# Patient Record
Sex: MALE | Race: WHITE | NOT HISPANIC OR LATINO | Employment: UNEMPLOYED | URBAN - METROPOLITAN AREA
[De-identification: names, ages, dates, MRNs, and addresses within clinical notes are randomized per-mention and may not be internally consistent; named-entity substitution may affect disease eponyms.]

---
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
## 2022-10-12 ENCOUNTER — HOSPITAL ENCOUNTER (EMERGENCY)
Facility: HOSPITAL | Age: 22
Discharge: HOME | End: 2022-10-12
Attending: EMERGENCY MEDICINE
Payer: COMMERCIAL

## 2022-10-12 VITALS
BODY MASS INDEX: 20.36 KG/M2 | WEIGHT: 145.94 LBS | OXYGEN SATURATION: 99 % | HEART RATE: 94 BPM | RESPIRATION RATE: 18 BRPM | SYSTOLIC BLOOD PRESSURE: 152 MMHG | TEMPERATURE: 98 F | DIASTOLIC BLOOD PRESSURE: 100 MMHG
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
## 2022-10-12 DIAGNOSIS — S90.462A INSECT BITE OF LEFT GREAT TOE, INITIAL ENCOUNTER: Primary | ICD-10-CM

## 2022-10-12 PROCEDURE — 99284 EMERGENCY DEPT VISIT MOD MDM: CPT

## 2022-10-12 PROCEDURE — 25000003 PHARM REV CODE 250: Performed by: EMERGENCY MEDICINE

## 2022-10-12 RX ORDER — ONDANSETRON 4 MG/1
4 TABLET, ORALLY DISINTEGRATING ORAL
Status: COMPLETED | OUTPATIENT
Start: 2022-10-12 | End: 2022-10-12

## 2022-10-12 RX ORDER — DICLOFENAC SODIUM 75 MG/1
75 TABLET, DELAYED RELEASE ORAL 2 TIMES DAILY PRN
Qty: 10 TABLET | Refills: 0 | Status: ON HOLD | OUTPATIENT
Start: 2022-10-12 | End: 2023-05-30 | Stop reason: HOSPADM

## 2022-10-12 RX ORDER — KETOROLAC TROMETHAMINE 10 MG/1
10 TABLET, FILM COATED ORAL
Status: COMPLETED | OUTPATIENT
Start: 2022-10-12 | End: 2022-10-12

## 2022-10-12 RX ORDER — ONDANSETRON 4 MG/1
4 TABLET, FILM COATED ORAL EVERY 6 HOURS
Qty: 12 TABLET | Refills: 0 | Status: ON HOLD | OUTPATIENT
Start: 2022-10-12 | End: 2023-05-30 | Stop reason: HOSPADM

## 2022-10-12 RX ADMIN — ONDANSETRON 4 MG: 4 TABLET, ORALLY DISINTEGRATING ORAL at 09:10

## 2022-10-12 RX ADMIN — KETOROLAC TROMETHAMINE 10 MG: 10 TABLET, FILM COATED ORAL at 09:10

## 2022-10-13 NOTE — ED NOTES
MD discussed discharge instructions with pt.  AVS printed and given to pt   Report called, no security available to transport patient at this time, per security they will come to zone 2 when someone is available     Shahrzad Rudolph RN  10/11/18 [id number]

## 2022-10-13 NOTE — ED PROVIDER NOTES
Encounter Date: 10/12/2022       History     Chief Complaint   Patient presents with    Insect Bite     Pt c/c of being bitten by a black spider approximately 30 min prior to arrival.  Bite area noted to left great toe.  Pt c/o pain and warmness to area.  Pt denies N/V     23 yo male here via POV with complaint of pain to left great toe after stung by black insect about 30 minutes ago. Initially erythema at bite site, now resolved. No swelling. Feels a burning sensation. Also reports nausea without vomiting. No prior similar. No aggravating or alleviating factors.     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    Alcohol abuse     Anxiety     Appendicitis 04/21/2019    Bronchitis     Depression     Mononucleosis     Schizophrenia, unspecified 6/14/2021    Sleep difficulties     Suicide attempt      Past Surgical History:   Procedure Laterality Date    FOOT SURGERY      FOOT SURGERY      LAPAROSCOPIC APPENDECTOMY N/A 4/22/2019    Procedure: APPENDECTOMY, LAPAROSCOPIC;  Surgeon: Tre Curtis MD;  Location: [facility];  Service: General;  Laterality: N/A;     Family History   Problem Relation Age of Onset    Anxiety disorder Mother     Depression Mother     Drug abuse Father     No Known Problems Sister     No Known Problems Paternal Aunt     No Known Problems Paternal Uncle     No Known Problems Maternal Grandfather     Dementia Maternal Grandmother     No Known Problems Paternal Grandmother     No Known Problems Cousin     No Known Problems Sister      Social History     Tobacco Use    Smoking status: Every Day     Packs/day: 0.50     Types: Cigarettes    Smokeless tobacco: Never   Substance Use Topics    Alcohol use: Yes     Alcohol/week: 24.0 standard drinks     Types: 24 Cans of beer per week    Drug use: Yes     Types: Marijuana     Review of  Systems   Constitutional: Negative.    Respiratory: Negative.     Cardiovascular: Negative.    Gastrointestinal: Negative.    All other systems reviewed and are negative.    Physical Exam     Initial Vitals [10/12/22 2128]   BP Pulse Resp Temp SpO2   (!) 152/100 94 18 98.1 °F (36.7 °C) 99 %      MAP       --         Physical Exam    Nursing note and vitals reviewed.  Constitutional: He is not diaphoretic. No distress.   HENT:   Head: Normocephalic and atraumatic.   Eyes: EOM are normal. Pupils are equal, round, and reactive to light.   Neck: Neck supple.   Normal range of motion.  Cardiovascular:  Normal rate, regular rhythm and intact distal pulses.           Pulmonary/Chest: Breath sounds normal. No respiratory distress. He has no wheezes. He has no rales.   Abdominal: Abdomen is soft. Bowel sounds are normal. He exhibits no distension. There is no abdominal tenderness. There is no rebound.   Musculoskeletal:         General: No tenderness or edema. Normal range of motion.      Cervical back: Normal range of motion and neck supple.     Neurological: He is alert and oriented to person, place, and time. GCS score is 15. GCS eye subscore is 4. GCS verbal subscore is 5. GCS motor subscore is 6.   Skin: Skin is warm and dry. Capillary refill takes less than 2 seconds. No rash noted. No erythema.       ED Course   Procedures  Labs Reviewed - No data to display       Imaging Results    None          Medications   ondansetron disintegrating tablet 4 mg (has no administration in time range)   ketorolac tablet 10 mg (has no administration in time range)                              Clinical Impression:   Final diagnoses:  [S90.462A, W57.XXXA] Insect bite of left great toe, initial encounter (Primary)        ED Disposition Condition    Discharge Stable          ED Prescriptions       Medication Sig Dispense Start Date End Date Auth. Provider    ondansetron (ZOFRAN) 4 MG tablet Take 1 tablet (4 mg total) by mouth every 6  (six) hours. 12 tablet 10/12/2022 -- Adolfo Frausto MD    diclofenac (VOLTAREN) 75 MG EC tablet Take 1 tablet (75 mg total) by mouth 2 (two) times daily as needed (pain). 10 tablet 10/12/2022 -- Adolfo Frausto MD          Follow-up Information       Follow up With Specialties Details Why Contact Info    Atif Cohen MD Internal Medicine Schedule an appointment as soon as possible for a visit   [address] [place] [id number]  [phone]               Adolfo Frausto MD  10/13/22 0552

## 2023-04-28 ENCOUNTER — OCCUPATIONAL HEALTH (OUTPATIENT)
Dept: URGENT CARE | Facility: CLINIC | Age: 23
End: 2023-04-28

## 2023-04-28 DIAGNOSIS — Z02.83 ENCOUNTER FOR DRUG SCREENING: Primary | ICD-10-CM

## 2023-04-28 PROCEDURE — 80305 MEDTOX HAIR COLLECTION ONLY: ICD-10-PCS | Mod: S$GLB,,, | Performed by: FAMILY MEDICINE

## 2023-04-28 PROCEDURE — 80305 DRUG TEST PRSMV DIR OPT OBS: CPT | Mod: S$GLB,,, | Performed by: FAMILY MEDICINE

## 2023-05-24 PROBLEM — R05.9 COUGH: Status: RESOLVED | Noted: 2018-02-11 | Resolved: 2023-05-24

## 2023-05-24 PROBLEM — F15.959 METHAMPHETAMINE-INDUCED PSYCHOTIC DISORDER: Status: ACTIVE | Noted: 2023-05-24

## 2023-05-24 PROBLEM — B27.90 MONONUCLEOSIS: Status: RESOLVED | Noted: 2019-04-19 | Resolved: 2023-05-24

## 2023-05-24 PROBLEM — F10.920 ALCOHOLIC INTOXICATION WITHOUT COMPLICATION: Status: RESOLVED | Noted: 2020-09-19 | Resolved: 2023-05-24

## 2023-05-24 PROBLEM — J20.9 ACUTE BRONCHITIS: Status: RESOLVED | Noted: 2018-02-11 | Resolved: 2023-05-24

## 2023-05-24 PROBLEM — K35.30 ACUTE APPENDICITIS WITH LOCALIZED PERITONITIS, WITHOUT PERFORATION, ABSCESS, OR GANGRENE: Status: RESOLVED | Noted: 2019-04-22 | Resolved: 2023-05-24

## 2023-05-24 PROBLEM — R52 PAIN: Status: RESOLVED | Noted: 2020-09-19 | Resolved: 2023-05-24

## 2023-09-01 ENCOUNTER — HOSPITAL ENCOUNTER (EMERGENCY)
Facility: HOSPITAL | Age: 23
Discharge: PSYCHIATRIC OR SUBSTANCE USE FACILITY | End: 2023-09-02
Attending: STUDENT IN AN ORGANIZED HEALTH CARE EDUCATION/TRAINING PROGRAM
Payer: COMMERCIAL

## 2023-09-01 VITALS
SYSTOLIC BLOOD PRESSURE: 141 MMHG | HEART RATE: 106 BPM | DIASTOLIC BLOOD PRESSURE: 88 MMHG | WEIGHT: 145 LBS | OXYGEN SATURATION: 100 % | HEIGHT: 71 IN | BODY MASS INDEX: 20.3 KG/M2 | RESPIRATION RATE: 17 BRPM | TEMPERATURE: 99 F
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
## 2023-09-01 DIAGNOSIS — R00.0 TACHYCARDIA: ICD-10-CM

## 2023-09-01 DIAGNOSIS — Z00.8 MEDICAL CLEARANCE FOR PSYCHIATRIC ADMISSION: ICD-10-CM

## 2023-09-01 DIAGNOSIS — F19.10 POLYSUBSTANCE ABUSE: Primary | ICD-10-CM

## 2023-09-01 LAB
ALBUMIN SERPL-MCNC: 4.9 G/DL (ref 3.5–5)
ALBUMIN/GLOB SERPL: 1.8 RATIO (ref 1.1–2)
ALP SERPL-CCNC: 101 UNIT/L (ref 40–150)
ALT SERPL-CCNC: 17 UNIT/L (ref 0–55)
AMPHET UR QL SCN: POSITIVE
APPEARANCE UR: CLEAR
AST SERPL-CCNC: 34 UNIT/L (ref 5–34)
BACTERIA #/AREA URNS AUTO: NORMAL /HPF
BARBITURATE SCN PRESENT UR: NEGATIVE
BASOPHILS # BLD AUTO: 0.02 X10(3)/MCL
BASOPHILS NFR BLD AUTO: 0.1 %
BENZODIAZ UR QL SCN: NEGATIVE
BILIRUB SERPL-MCNC: 2.2 MG/DL
BILIRUB UR QL STRIP.AUTO: ABNORMAL
BNP BLD-MCNC: <10 PG/ML
BUN SERPL-MCNC: 25.4 MG/DL (ref 8.9–20.6)
CALCIUM SERPL-MCNC: 10 MG/DL (ref 8.4–10.2)
CANNABINOIDS UR QL SCN: POSITIVE
CHLORIDE SERPL-SCNC: 99 MMOL/L (ref 98–107)
CK SERPL-CCNC: 593 U/L (ref 30–200)
CO2 SERPL-SCNC: 22 MMOL/L (ref 22–29)
COCAINE UR QL SCN: NEGATIVE
COLOR UR: ABNORMAL
CREAT SERPL-MCNC: 1.06 MG/DL (ref 0.73–1.18)
EOSINOPHIL # BLD AUTO: 0.03 X10(3)/MCL (ref 0–0.9)
EOSINOPHIL NFR BLD AUTO: 0.2 %
ERYTHROCYTE [DISTWIDTH] IN BLOOD BY AUTOMATED COUNT: 12.4 % (ref 11.5–17)
FENTANYL UR QL SCN: POSITIVE
GFR SERPLBLD CREATININE-BSD FMLA CKD-EPI: >60 MLS/MIN/1.73/M2
GLOBULIN SER-MCNC: 2.7 GM/DL (ref 2.4–3.5)
GLUCOSE SERPL-MCNC: 67 MG/DL (ref 74–100)
GLUCOSE UR QL STRIP.AUTO: NEGATIVE
GROUP & RH: NORMAL
HCT VFR BLD AUTO: 46.4 % (ref 42–52)
HGB BLD-MCNC: 16.4 G/DL (ref 14–18)
IMM GRANULOCYTES # BLD AUTO: 0.06 X10(3)/MCL (ref 0–0.04)
IMM GRANULOCYTES NFR BLD AUTO: 0.4 %
INDIRECT COOMBS GEL: NORMAL
INR PPP: 1.2
KETONES UR QL STRIP.AUTO: ABNORMAL
LEUKOCYTE ESTERASE UR QL STRIP.AUTO: ABNORMAL
LYMPHOCYTES # BLD AUTO: 1.63 X10(3)/MCL (ref 0.6–4.6)
LYMPHOCYTES NFR BLD AUTO: 11 %
MAGNESIUM SERPL-MCNC: 2.1 MG/DL (ref 1.6–2.6)
MCH RBC QN AUTO: 30.7 PG (ref 27–31)
MCHC RBC AUTO-ENTMCNC: 35.3 G/DL (ref 33–36)
MCV RBC AUTO: 86.7 FL (ref 80–94)
MDMA UR QL SCN: POSITIVE
MONOCYTES # BLD AUTO: 1.84 X10(3)/MCL (ref 0.1–1.3)
MONOCYTES NFR BLD AUTO: 12.4 %
NEUTROPHILS # BLD AUTO: 11.23 X10(3)/MCL (ref 2.1–9.2)
NEUTROPHILS NFR BLD AUTO: 75.9 %
NITRITE UR QL STRIP.AUTO: NEGATIVE
NRBC BLD AUTO-RTO: 0 %
OPIATES UR QL SCN: POSITIVE
PCP UR QL: NEGATIVE
PH UR STRIP.AUTO: 6 [PH]
PH UR: 6 [PH] (ref 3–11)
PLATELET # BLD AUTO: 348 X10(3)/MCL (ref 130–400)
PMV BLD AUTO: 9.4 FL (ref 7.4–10.4)
POTASSIUM SERPL-SCNC: 3.8 MMOL/L (ref 3.5–5.1)
PROT SERPL-MCNC: 7.6 GM/DL (ref 6.4–8.3)
PROT UR QL STRIP.AUTO: ABNORMAL
PROTHROMBIN TIME: 14.8 SECONDS (ref 12.5–14.5)
RBC # BLD AUTO: 5.35 X10(6)/MCL (ref 4.7–6.1)
RBC #/AREA URNS AUTO: <5 /HPF
RBC UR QL AUTO: NEGATIVE
SODIUM SERPL-SCNC: 137 MMOL/L (ref 136–145)
SP GR UR STRIP.AUTO: 1.04 (ref 1–1.03)
SPECIFIC GRAVITY, URINE AUTO (.000) (OHS): 1.04 (ref 1–1.03)
SPECIMEN OUTDATE: NORMAL
SQUAMOUS #/AREA URNS AUTO: <5 /HPF
T4 FREE SERPL-MCNC: 1.42 NG/DL (ref 0.7–1.48)
TROPONIN I SERPL-MCNC: <0.01 NG/ML (ref 0–0.04)
TSH SERPL-ACNC: 1.78 UIU/ML (ref 0.35–4.94)
UROBILINOGEN UR STRIP-ACNC: 1
WBC # SPEC AUTO: 14.81 X10(3)/MCL (ref 4.5–11.5)
WBC #/AREA URNS AUTO: <5 /HPF

## 2023-09-01 PROCEDURE — 83735 ASSAY OF MAGNESIUM: CPT | Performed by: STUDENT IN AN ORGANIZED HEALTH CARE EDUCATION/TRAINING PROGRAM

## 2023-09-01 PROCEDURE — 96374 THER/PROPH/DIAG INJ IV PUSH: CPT

## 2023-09-01 PROCEDURE — 99285 EMERGENCY DEPT VISIT HI MDM: CPT | Mod: 25

## 2023-09-01 PROCEDURE — 80307 DRUG TEST PRSMV CHEM ANLYZR: CPT | Performed by: STUDENT IN AN ORGANIZED HEALTH CARE EDUCATION/TRAINING PROGRAM

## 2023-09-01 PROCEDURE — 83880 ASSAY OF NATRIURETIC PEPTIDE: CPT | Performed by: STUDENT IN AN ORGANIZED HEALTH CARE EDUCATION/TRAINING PROGRAM

## 2023-09-01 PROCEDURE — 96361 HYDRATE IV INFUSION ADD-ON: CPT

## 2023-09-01 PROCEDURE — 82550 ASSAY OF CK (CPK): CPT | Performed by: STUDENT IN AN ORGANIZED HEALTH CARE EDUCATION/TRAINING PROGRAM

## 2023-09-01 PROCEDURE — 96375 TX/PRO/DX INJ NEW DRUG ADDON: CPT

## 2023-09-01 PROCEDURE — 84439 ASSAY OF FREE THYROXINE: CPT | Performed by: STUDENT IN AN ORGANIZED HEALTH CARE EDUCATION/TRAINING PROGRAM

## 2023-09-01 PROCEDURE — 63600175 PHARM REV CODE 636 W HCPCS: Performed by: STUDENT IN AN ORGANIZED HEALTH CARE EDUCATION/TRAINING PROGRAM

## 2023-09-01 PROCEDURE — 84484 ASSAY OF TROPONIN QUANT: CPT | Performed by: STUDENT IN AN ORGANIZED HEALTH CARE EDUCATION/TRAINING PROGRAM

## 2023-09-01 PROCEDURE — 85610 PROTHROMBIN TIME: CPT | Performed by: STUDENT IN AN ORGANIZED HEALTH CARE EDUCATION/TRAINING PROGRAM

## 2023-09-01 PROCEDURE — 86900 BLOOD TYPING SEROLOGIC ABO: CPT | Performed by: STUDENT IN AN ORGANIZED HEALTH CARE EDUCATION/TRAINING PROGRAM

## 2023-09-01 PROCEDURE — 84443 ASSAY THYROID STIM HORMONE: CPT | Performed by: STUDENT IN AN ORGANIZED HEALTH CARE EDUCATION/TRAINING PROGRAM

## 2023-09-01 PROCEDURE — 25500020 PHARM REV CODE 255: Performed by: STUDENT IN AN ORGANIZED HEALTH CARE EDUCATION/TRAINING PROGRAM

## 2023-09-01 PROCEDURE — 96372 THER/PROPH/DIAG INJ SC/IM: CPT | Mod: 59 | Performed by: STUDENT IN AN ORGANIZED HEALTH CARE EDUCATION/TRAINING PROGRAM

## 2023-09-01 PROCEDURE — 85025 COMPLETE CBC W/AUTO DIFF WBC: CPT | Performed by: STUDENT IN AN ORGANIZED HEALTH CARE EDUCATION/TRAINING PROGRAM

## 2023-09-01 PROCEDURE — 80053 COMPREHEN METABOLIC PANEL: CPT | Performed by: STUDENT IN AN ORGANIZED HEALTH CARE EDUCATION/TRAINING PROGRAM

## 2023-09-01 PROCEDURE — 81001 URINALYSIS AUTO W/SCOPE: CPT | Performed by: STUDENT IN AN ORGANIZED HEALTH CARE EDUCATION/TRAINING PROGRAM

## 2023-09-01 RX ORDER — LORAZEPAM 2 MG/ML
2 INJECTION INTRAMUSCULAR
Status: COMPLETED | OUTPATIENT
Start: 2023-09-01 | End: 2023-09-01

## 2023-09-01 RX ORDER — KETOROLAC TROMETHAMINE 30 MG/ML
15 INJECTION, SOLUTION INTRAMUSCULAR; INTRAVENOUS
Status: COMPLETED | OUTPATIENT
Start: 2023-09-01 | End: 2023-09-01

## 2023-09-01 RX ORDER — ONDANSETRON 2 MG/ML
4 INJECTION INTRAMUSCULAR; INTRAVENOUS
Status: COMPLETED | OUTPATIENT
Start: 2023-09-01 | End: 2023-09-01

## 2023-09-01 RX ORDER — MORPHINE SULFATE 4 MG/ML
4 INJECTION, SOLUTION INTRAMUSCULAR; INTRAVENOUS
Status: DISCONTINUED | OUTPATIENT
Start: 2023-09-01 | End: 2023-09-01

## 2023-09-01 RX ORDER — MORPHINE SULFATE 4 MG/ML
4 INJECTION, SOLUTION INTRAMUSCULAR; INTRAVENOUS
Status: COMPLETED | OUTPATIENT
Start: 2023-09-01 | End: 2023-09-01

## 2023-09-01 RX ORDER — LORAZEPAM 2 MG/ML
2 INJECTION INTRAMUSCULAR
Status: DISCONTINUED | OUTPATIENT
Start: 2023-09-01 | End: 2023-09-02 | Stop reason: HOSPADM

## 2023-09-01 RX ORDER — ZIPRASIDONE MESYLATE 20 MG/ML
20 INJECTION, POWDER, LYOPHILIZED, FOR SOLUTION INTRAMUSCULAR
Status: COMPLETED | OUTPATIENT
Start: 2023-09-01 | End: 2023-09-01

## 2023-09-01 RX ADMIN — LORAZEPAM 2 MG: 2 INJECTION INTRAMUSCULAR; INTRAVENOUS at 10:09

## 2023-09-01 RX ADMIN — IOPAMIDOL 100 ML: 755 INJECTION, SOLUTION INTRAVENOUS at 04:09

## 2023-09-01 RX ADMIN — SODIUM CHLORIDE, POTASSIUM CHLORIDE, SODIUM LACTATE AND CALCIUM CHLORIDE 1000 ML: 600; 310; 30; 20 INJECTION, SOLUTION INTRAVENOUS at 10:09

## 2023-09-01 RX ADMIN — ZIPRASIDONE MESYLATE 20 MG: 20 INJECTION, POWDER, LYOPHILIZED, FOR SOLUTION INTRAMUSCULAR at 12:09

## 2023-09-01 RX ADMIN — LORAZEPAM 2 MG: 2 INJECTION INTRAMUSCULAR; INTRAVENOUS at 12:09

## 2023-09-01 RX ADMIN — KETOROLAC TROMETHAMINE 15 MG: 30 INJECTION, SOLUTION INTRAMUSCULAR; INTRAVENOUS at 07:09

## 2023-09-01 RX ADMIN — ONDANSETRON 4 MG: 2 INJECTION INTRAMUSCULAR; INTRAVENOUS at 03:09

## 2023-09-01 RX ADMIN — MORPHINE SULFATE 4 MG: 4 INJECTION INTRAVENOUS at 03:09

## 2023-09-01 RX ADMIN — SODIUM CHLORIDE, POTASSIUM CHLORIDE, SODIUM LACTATE AND CALCIUM CHLORIDE 1000 ML: 600; 310; 30; 20 INJECTION, SOLUTION INTRAVENOUS at 06:09
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
## 2023-09-25 ENCOUNTER — HOSPITAL ENCOUNTER (EMERGENCY)
Facility: HOSPITAL | Age: 23
Discharge: HOME | End: 2023-09-26
Attending: STUDENT IN AN ORGANIZED HEALTH CARE EDUCATION/TRAINING PROGRAM
Payer: COMMERCIAL

## 2023-09-25 DIAGNOSIS — T14.90XA TRAUMA: ICD-10-CM

## 2023-09-25 DIAGNOSIS — S47.2XXA CRUSH INJURY ARM, LEFT, INITIAL ENCOUNTER: Primary | ICD-10-CM

## 2023-09-25 LAB
ABORH RETYPE: NORMAL
ALBUMIN SERPL-MCNC: 4.6 G/DL (ref 3.5–5)
ALBUMIN/GLOB SERPL: 1.8 RATIO (ref 1.1–2)
ALP SERPL-CCNC: 81 UNIT/L (ref 40–150)
ALT SERPL-CCNC: 17 UNIT/L (ref 0–55)
APTT PPP: 22.3 SECONDS (ref 23.2–33.7)
AST SERPL-CCNC: 24 UNIT/L (ref 5–34)
BASOPHILS # BLD AUTO: 0.02 X10(3)/MCL
BASOPHILS NFR BLD AUTO: 0.3 %
BILIRUB SERPL-MCNC: 0.4 MG/DL
BUN SERPL-MCNC: 11.4 MG/DL (ref 8.9–20.6)
CALCIUM SERPL-MCNC: 9.9 MG/DL (ref 8.4–10.2)
CHLORIDE SERPL-SCNC: 105 MMOL/L (ref 98–107)
CK SERPL-CCNC: 117 U/L (ref 30–200)
CO2 SERPL-SCNC: 21 MMOL/L (ref 22–29)
CREAT SERPL-MCNC: 1.17 MG/DL (ref 0.73–1.18)
EOSINOPHIL # BLD AUTO: 0.02 X10(3)/MCL (ref 0–0.9)
EOSINOPHIL NFR BLD AUTO: 0.3 %
ERYTHROCYTE [DISTWIDTH] IN BLOOD BY AUTOMATED COUNT: 11.9 % (ref 11.5–17)
ETHANOL SERPL-MCNC: <10 MG/DL
GFR SERPLBLD CREATININE-BSD FMLA CKD-EPI: >60 MLS/MIN/1.73/M2
GLOBULIN SER-MCNC: 2.6 GM/DL (ref 2.4–3.5)
GLUCOSE SERPL-MCNC: 92 MG/DL (ref 74–100)
GROUP & RH: NORMAL
HCT VFR BLD AUTO: 44.3 % (ref 42–52)
HGB BLD-MCNC: 15.7 G/DL (ref 14–18)
IMM GRANULOCYTES # BLD AUTO: 0.01 X10(3)/MCL (ref 0–0.04)
IMM GRANULOCYTES NFR BLD AUTO: 0.1 %
INDIRECT COOMBS GEL: NORMAL
INR PPP: 1
LACTATE SERPL-SCNC: 0.6 MMOL/L (ref 0.5–2.2)
LACTATE SERPL-SCNC: 4.4 MMOL/L (ref 0.5–2.2)
LYMPHOCYTES # BLD AUTO: 2.35 X10(3)/MCL (ref 0.6–4.6)
LYMPHOCYTES NFR BLD AUTO: 30.4 %
MCH RBC QN AUTO: 30.7 PG (ref 27–31)
MCHC RBC AUTO-ENTMCNC: 35.4 G/DL (ref 33–36)
MCV RBC AUTO: 86.7 FL (ref 80–94)
MONOCYTES # BLD AUTO: 0.58 X10(3)/MCL (ref 0.1–1.3)
MONOCYTES NFR BLD AUTO: 7.5 %
NEUTROPHILS # BLD AUTO: 4.76 X10(3)/MCL (ref 2.1–9.2)
NEUTROPHILS NFR BLD AUTO: 61.4 %
NRBC BLD AUTO-RTO: 0 %
PLATELET # BLD AUTO: 288 X10(3)/MCL (ref 130–400)
PMV BLD AUTO: 9.9 FL (ref 7.4–10.4)
POTASSIUM SERPL-SCNC: 4 MMOL/L (ref 3.5–5.1)
PROT SERPL-MCNC: 7.2 GM/DL (ref 6.4–8.3)
PROTHROMBIN TIME: 13 SECONDS (ref 12.5–14.5)
RBC # BLD AUTO: 5.11 X10(6)/MCL (ref 4.7–6.1)
SODIUM SERPL-SCNC: 139 MMOL/L (ref 136–145)
SPECIMEN OUTDATE: NORMAL
WBC # SPEC AUTO: 7.74 X10(3)/MCL (ref 4.5–11.5)

## 2023-09-25 PROCEDURE — 90471 IMMUNIZATION ADMIN: CPT | Performed by: STUDENT IN AN ORGANIZED HEALTH CARE EDUCATION/TRAINING PROGRAM

## 2023-09-25 PROCEDURE — 83605 ASSAY OF LACTIC ACID: CPT | Performed by: STUDENT IN AN ORGANIZED HEALTH CARE EDUCATION/TRAINING PROGRAM

## 2023-09-25 PROCEDURE — 85025 COMPLETE CBC W/AUTO DIFF WBC: CPT | Performed by: STUDENT IN AN ORGANIZED HEALTH CARE EDUCATION/TRAINING PROGRAM

## 2023-09-25 PROCEDURE — 63600175 PHARM REV CODE 636 W HCPCS: Performed by: STUDENT IN AN ORGANIZED HEALTH CARE EDUCATION/TRAINING PROGRAM

## 2023-09-25 PROCEDURE — 96375 TX/PRO/DX INJ NEW DRUG ADDON: CPT

## 2023-09-25 PROCEDURE — 96361 HYDRATE IV INFUSION ADD-ON: CPT

## 2023-09-25 PROCEDURE — 80053 COMPREHEN METABOLIC PANEL: CPT | Performed by: STUDENT IN AN ORGANIZED HEALTH CARE EDUCATION/TRAINING PROGRAM

## 2023-09-25 PROCEDURE — 82077 ASSAY SPEC XCP UR&BREATH IA: CPT | Performed by: STUDENT IN AN ORGANIZED HEALTH CARE EDUCATION/TRAINING PROGRAM

## 2023-09-25 PROCEDURE — 82550 ASSAY OF CK (CPK): CPT | Performed by: STUDENT IN AN ORGANIZED HEALTH CARE EDUCATION/TRAINING PROGRAM

## 2023-09-25 PROCEDURE — 25500020 PHARM REV CODE 255: Performed by: STUDENT IN AN ORGANIZED HEALTH CARE EDUCATION/TRAINING PROGRAM

## 2023-09-25 PROCEDURE — G0390 TRAUMA RESPONS W/HOSP CRITI: HCPCS

## 2023-09-25 PROCEDURE — 85610 PROTHROMBIN TIME: CPT | Performed by: STUDENT IN AN ORGANIZED HEALTH CARE EDUCATION/TRAINING PROGRAM

## 2023-09-25 PROCEDURE — 85730 THROMBOPLASTIN TIME PARTIAL: CPT | Performed by: STUDENT IN AN ORGANIZED HEALTH CARE EDUCATION/TRAINING PROGRAM

## 2023-09-25 PROCEDURE — 86900 BLOOD TYPING SEROLOGIC ABO: CPT | Performed by: STUDENT IN AN ORGANIZED HEALTH CARE EDUCATION/TRAINING PROGRAM

## 2023-09-25 PROCEDURE — 90715 TDAP VACCINE 7 YRS/> IM: CPT | Performed by: STUDENT IN AN ORGANIZED HEALTH CARE EDUCATION/TRAINING PROGRAM

## 2023-09-25 PROCEDURE — 96374 THER/PROPH/DIAG INJ IV PUSH: CPT | Mod: 59

## 2023-09-25 PROCEDURE — 99285 EMERGENCY DEPT VISIT HI MDM: CPT | Mod: 25

## 2023-09-25 RX ORDER — ONDANSETRON 2 MG/ML
4 INJECTION INTRAMUSCULAR; INTRAVENOUS
Status: COMPLETED | OUTPATIENT
Start: 2023-09-25 | End: 2023-09-25

## 2023-09-25 RX ORDER — METHOCARBAMOL 100 MG/ML
1000 INJECTION, SOLUTION INTRAMUSCULAR; INTRAVENOUS ONCE
Status: COMPLETED | OUTPATIENT
Start: 2023-09-25 | End: 2023-09-25

## 2023-09-25 RX ORDER — SODIUM CHLORIDE, SODIUM LACTATE, POTASSIUM CHLORIDE, CALCIUM CHLORIDE 600; 310; 30; 20 MG/100ML; MG/100ML; MG/100ML; MG/100ML
INJECTION, SOLUTION INTRAVENOUS
Status: COMPLETED | OUTPATIENT
Start: 2023-09-25 | End: 2023-09-25

## 2023-09-25 RX ORDER — FENTANYL CITRATE 50 UG/ML
50 INJECTION, SOLUTION INTRAMUSCULAR; INTRAVENOUS
Status: COMPLETED | OUTPATIENT
Start: 2023-09-25 | End: 2023-09-25

## 2023-09-25 RX ADMIN — TETANUS TOXOID, REDUCED DIPHTHERIA TOXOID AND ACELLULAR PERTUSSIS VACCINE, ADSORBED 0.5 ML: 5; 2.5; 8; 8; 2.5 SUSPENSION INTRAMUSCULAR at 08:09

## 2023-09-25 RX ADMIN — SODIUM CHLORIDE, POTASSIUM CHLORIDE, SODIUM LACTATE AND CALCIUM CHLORIDE 1000 ML: 600; 310; 30; 20 INJECTION, SOLUTION INTRAVENOUS at 08:09

## 2023-09-25 RX ADMIN — SODIUM CHLORIDE, POTASSIUM CHLORIDE, SODIUM LACTATE AND CALCIUM CHLORIDE 1000 ML: 600; 310; 30; 20 INJECTION, SOLUTION INTRAVENOUS at 09:09

## 2023-09-25 RX ADMIN — FENTANYL CITRATE 50 MCG: 50 INJECTION, SOLUTION INTRAMUSCULAR; INTRAVENOUS at 08:09

## 2023-09-25 RX ADMIN — ONDANSETRON 4 MG: 2 INJECTION INTRAMUSCULAR; INTRAVENOUS at 08:09

## 2023-09-25 RX ADMIN — IOPAMIDOL 100 ML: 755 INJECTION, SOLUTION INTRAVENOUS at 08:09

## 2023-09-25 RX ADMIN — METHOCARBAMOL 1000 MG: 100 INJECTION, SOLUTION INTRAMUSCULAR; INTRAVENOUS at 08:09

## 2023-09-26 VITALS
HEART RATE: 75 BPM | RESPIRATION RATE: 12 BRPM | OXYGEN SATURATION: 98 % | TEMPERATURE: 99 F | SYSTOLIC BLOOD PRESSURE: 149 MMHG | HEIGHT: 71 IN | WEIGHT: 146 LBS | DIASTOLIC BLOOD PRESSURE: 92 MMHG | BODY MASS INDEX: 20.44 KG/M2

## 2023-09-26 LAB
AMPHET UR QL SCN: NEGATIVE
APPEARANCE UR: CLEAR
BACTERIA #/AREA URNS AUTO: NORMAL /HPF
BARBITURATE SCN PRESENT UR: NEGATIVE
BENZODIAZ UR QL SCN: NEGATIVE
BILIRUB UR QL STRIP.AUTO: NEGATIVE
CANNABINOIDS UR QL SCN: NEGATIVE
COCAINE UR QL SCN: NEGATIVE
COLOR UR AUTO: YELLOW
FENTANYL UR QL SCN: NEGATIVE
GLUCOSE UR QL STRIP.AUTO: NEGATIVE
KETONES UR QL STRIP.AUTO: NEGATIVE
LEUKOCYTE ESTERASE UR QL STRIP.AUTO: NEGATIVE
MDMA UR QL SCN: NEGATIVE
NITRITE UR QL STRIP.AUTO: NEGATIVE
OPIATES UR QL SCN: NEGATIVE
PCP UR QL: NEGATIVE
PH UR STRIP.AUTO: 6.5 [PH]
PH UR: 6.5 [PH] (ref 3–11)
PROT UR QL STRIP.AUTO: NEGATIVE
RBC #/AREA URNS AUTO: <5 /HPF
RBC UR QL AUTO: NEGATIVE
SP GR UR STRIP.AUTO: 1.03 (ref 1–1.03)
SPECIFIC GRAVITY, URINE AUTO (.000) (OHS): 1.03 (ref 1–1.03)
SQUAMOUS #/AREA URNS AUTO: <5 /HPF
UROBILINOGEN UR STRIP-ACNC: 1
WBC #/AREA URNS AUTO: <5 /HPF

## 2023-09-26 PROCEDURE — 80307 DRUG TEST PRSMV CHEM ANLYZR: CPT | Performed by: STUDENT IN AN ORGANIZED HEALTH CARE EDUCATION/TRAINING PROGRAM

## 2023-09-26 PROCEDURE — 81001 URINALYSIS AUTO W/SCOPE: CPT | Performed by: STUDENT IN AN ORGANIZED HEALTH CARE EDUCATION/TRAINING PROGRAM

## 2023-09-26 RX ORDER — ONDANSETRON 4 MG/1
4 TABLET, ORALLY DISINTEGRATING ORAL EVERY 6 HOURS PRN
Qty: 12 TABLET | Refills: 0 | Status: SHIPPED | OUTPATIENT
Start: 2023-09-26

## 2023-09-26 RX ORDER — HYDROCODONE BITARTRATE AND ACETAMINOPHEN 5; 325 MG/1; MG/1
1 TABLET ORAL EVERY 6 HOURS PRN
Qty: 12 TABLET | Refills: 0 | Status: SHIPPED | OUTPATIENT
Start: 2023-09-26

## 2023-09-26 RX ORDER — METHOCARBAMOL 500 MG/1
1000 TABLET, FILM COATED ORAL 3 TIMES DAILY
Qty: 30 TABLET | Refills: 0 | Status: SHIPPED | OUTPATIENT
Start: 2023-09-26 | End: 2023-10-01

## 2023-09-26 RX ORDER — IBUPROFEN 600 MG/1
600 TABLET ORAL EVERY 6 HOURS PRN
Qty: 20 TABLET | Refills: 0 | Status: SHIPPED | OUTPATIENT
Start: 2023-09-26

## 2023-09-26 NOTE — CONSULTS
"   Trauma Surgery   Activation Note    Patient Name: Dean Wilson  MRN: [id number]   YOB: 2000  Date: 09/25/2023    LEVEL 1 TRAUMA     Subjective:   History of present illness: Patient is an approximately 23 year old male who presents to the ED via EMS as a level 1 trauma following crush injury.  Per EMS he was working underneath a car when the Rodri slipped in the engine compartment fell on the patient chest, left upper extremity, pelvis.  Patient complains of left shoulder, chest, left leg and pelvic pain.  GCS 15.  Hemodynamically stable.  Satting on room air.    Primary Survey:  A Patent, speaking   B CTAB   C Distal pulses intact   D GCS 15(E 4, V 5, M 6)    E exposed, log-rolled and examined (see below)   F See below     VITAL SIGNS: 24 HR MIN & MAX LAST   Temp  Min: 97.6 °F (36.4 °C)  Max: 98.5 °F (36.9 °C)  98.5 °F (36.9 °C)   BP  Min: 131/78  Max: 144/81  137/86    Pulse  Min: 84  Max: 92  86    Resp  Min: 11  Max: 18  11    SpO2  Min: 97 %  Max: 100 %  99 %      HT: 5' 11" (180.3 cm)  WT: 66.2 kg (146 lb)  BMI: 20.4     FAST: negative    Scheduled Meds:   fentaNYL  50 mcg Intravenous ED 1 Time    methocarbamoL  1,000 mg Intravenous Once    ondansetron  4 mg Intravenous ED 1 Time     Continuous Infusions:  PRN Meds:    ROS: unable to assess secondary to patients condition     Allergies: NKDA  PMH: psychiatric history, was on suboxone   PSH: Noncontributory  Social history: Unknown  Objective:   Secondary Survey:   General: Well developed, well nourished, no acute distress, AAOx3  Neuro: CNII-XII grossly intact  HEENT:  Normocephalic, atraumatic, PERRL, cervical collar in place  CV:  RRR  Pulse: 2+ RP b/l, 2+ DP b/l   Resp/chest:  Non-labored breathing, satting on room air  GI:  Abdomen soft, non-tender, non-distended  Extremities: Moves all 4 spontaneously and purposefully, no obvious gross deformities.  Back/Spine: No bony TTP, no palpable step offs or deformities.  Cervical back: " "Normal. No tenderness.  Thoracic back: Normal. No tenderness.  Lumbar back: Normal. No tenderness.  Skin/wounds:  Warm, well perfused, bruising to the left hip, erythema to left lower extremity, left forearm superficial skin tear\    Labs:  Troponin:  No results for input(s): "TROPONINI" in the last 72 hours.  CBC:  Recent Labs     09/25/23 2023   WBC 7.74   RBC 5.11   HGB 15.7   HCT 44.3      MCV 86.7   MCH 30.7   MCHC 35.4     CMP:  No results for input(s): "GLU", "CALCIUM", "ALBUMIN", "PROT", "NA", "K", "CO2", "CL", "BUN", "CREATININE", "ALKPHOS", "ALT", "AST", "BILITOT" in the last 72 hours.  Lactic Acid:  No results for input(s): "LACTATE" in the last 72 hours.  ETOH:  No results for input(s): "ETHANOL" in the last 72 hours.   Urine Drug Screen:  No results for input(s): "COCAINE", "OPIATE", "BARBITURATE", "AMPHETAMINE", "FENTANYL", "CANNABINOIDS", "MDMA" in the last 72 hours.    Invalid input(s): "BENZODIAZEPINE", "PHENCYCLIDINE"   ABG:  No results for input(s): "PH", "PO2", "PCO2", "HCO3", "BE" in the last 168 hours.     Imaging:  CT Cervical Spine Without Contrast   Final Result      1. No acute cervical spine abnormality identified.      2. Ligament, spinal cord and/or vascular abnormalities cannot be excluded on the basis of this examination.         Electronically signed by: Neftali Moran   Date:    09/25/2023   Time:    20:46      CT Head Without Contrast   Final Result      No acute intracranial abnormality identified.         Electronically signed by: Neftali Moran   Date:    09/25/2023   Time:    20:44      X-Ray Pelvis Routine AP   Final Result      No acute osseous abnormality, fracture, or dislocation.      There is no significant degenerative change.         Electronically signed by: Neftali Moran   Date:    09/25/2023   Time:    20:44      X-Ray Chest 1 View   Final Result      No acute cardiopulmonary process.         Electronically signed by: Neftali Moran   Date:    09/25/2023 "   Time:    20:45      CT Chest Abdomen Pelvis With Contrast (xpd)    (Results Pending)   X-Ray Hand 3 view Left    (Results Pending)   X-Ray Forearm Left    (Results Pending)   X-Ray Shoulder 2 or More Views Left    (Results Pending)   X-Ray Tibia Fibula 2 View Left    (Results Pending)   X-Ray Femur 2 View Left    (Results Pending)        Assessment & Plan:     Imaging negative for acute traumatic injuries. Dispo per ED         9/25/2023 8:49 PM     The above findings, diagnostics, and treatment plan were discussed with Dr. Khanh Shafer who will follow with further assessments and recommendations. Please call with any questions, concerns, or clinical status changes.  This note/OR report was created with the assistance of  voice recognition software or phone  dictation.  There may be transcription errors as a result of using this technology however minimal. Effort has been made to assure accuracy of transcription but any obvious errors or omissions should be clarified with the author of the document.

## 2023-09-26 NOTE — ED PROVIDER NOTES
Encounter Date: 9/25/2023    SCRIBE #1 NOTE: I, Ishaan Martinez, am scribing for, and in the presence of,  Adolfo Ann MD. I have scribed the following portions of the note - Other sections scribed: HPI, ROS, PE.       History   No chief complaint on file.    23 year old male presents to ED via EMS as level 1 trauma activation for trauma onset just PTA. Per EMS, pt was working under car when giovani slipped and engine compartment fell on pt's chest and pelvis, vitals stable en route, no obvious deformities. Pt reports pain to his left shoulder, chest, left hand, left leg, and pelvis, rated 10/10. Pt denies SOB.    The history is provided by the patient and the EMS personnel. No  was used.     Review of patient's allergies indicates:  Not on File  No past medical history on file.  No past surgical history on file.  No family history on file.     Review of Systems   Respiratory:  Negative for shortness of breath.    Cardiovascular:  Positive for chest pain.   Musculoskeletal:         Left shoulder pain, left hand pain, pelvis pain, left leg pain       Physical Exam     Initial Vitals [09/25/23 2013]   BP Pulse Resp Temp SpO2   136/84 92 18 97.6 °F (36.4 °C) 100 %      MAP       --         Physical Exam    Nursing note and vitals reviewed.  Constitutional: Airway: Normal. Breathing: Normal. Circulation: Normal. Pulses:Radial palpable. Cervical collar in place.   Airway intact   Eyes: Pupils: Normal pupils.   Pupils 4-5 mm reactive   Cardiovascular:            Pulses:       Radial pulses are 2+ on the right side and 2+ on the left side.        Dorsalis pedis pulses are 2+ on the right side and 2+ on the left side.   Pulmonary/Chest:   Equal bilateral breath sounds   Abdominal: Abdomen is soft. There is no abdominal tenderness. The pelvis is stable.   Genitourinary:    Testes normal.      Genitourinary Comments: Scrotum normal     Musculoskeletal:      Left upper arm: No deformity.       Cervical back: No deformity or bony tenderness. Normal.      Thoracic back: Normal. No deformity or bony tenderness.      Lumbar back: Normal. No deformity or bony tenderness.      Left upper leg: No deformity.      Comments: No step offs or deformities to back, pelvis stable     Neurological: GCS eye subscore is 4. GCS verbal subscore is 5. GCS motor subscore is 6.   Skin:   Bruising to left hip, erythema to left shin, erythema to left knee, superficial skin tear to left forearm         ED Course   ED US FAST    Date/Time: 9/25/2023 8:20 PM    Performed by: Adolfo Ann MD  Authorized by: Adolfo Ann MD    Indication:  Blunt trauma  Identified Structures:  The pericardium, hepatorenal space, splenorenal space, and pelvic cul-de-sac were examined and The right and left hemithoraces were also examined  The following findings in the peritoneal, pericardial, and pleural spaces were obtained:     Pericardial effusion:  Absent    Hepatorenal free fluid:  Absent    Splenorenal free fluid:  Absent    Suprapubic/Pouch of Ede free fluid:  Absent    Right thoracic free fluid:  Absent    Right lung sliding:  Present    Left thoracic free fluid:  Absent    Left lung sliding:  Present    Impression:  No pathologic free fluid    Charge?:  Yes    Labs Reviewed   COMPREHENSIVE METABOLIC PANEL - Abnormal; Notable for the following components:       Result Value    Carbon Dioxide 21 (*)     All other components within normal limits   APTT - Abnormal; Notable for the following components:    PTT 22.3 (*)     All other components within normal limits   LACTIC ACID, PLASMA - Abnormal; Notable for the following components:    Lactic Acid Level 4.4 (*)     All other components within normal limits   URINALYSIS, REFLEX TO URINE CULTURE - Abnormal; Notable for the following components:    Specific Gravity, UA 1.035 (*)     All other components within normal limits   PROTIME-INR - Normal   DRUG SCREEN, URINE (BEAKER) -  Normal    Narrative:     Cut off concentrations:    Amphetamines - 1000 ng/ml  Barbiturates - 200 ng/ml  Benzodiazepine - 200 ng/ml  Cannabinoids (THC) - 50 ng/ml  Cocaine - 300 ng/ml  Fentanyl - 1.0 ng/ml  MDMA - 500 ng/ml  Opiates - 300 ng/ml   Phencyclidine (PCP) - 25 ng/ml    Specimen submitted for drug analysis and tested for pH and specific gravity in order to evaluate sample integrity. Suspect tampering if specific gravity is <1.003 and/or pH is not within the range of 4.5 - 8.0  False negatives may result form substances such as bleach added to urine.  False positives may result for the presence of a substance with similar chemical structure to the drug or its metabolite.    This test provides only a PRELIMINARY analytical test result. A more specific alternate chemical method must be used in order to obtain a confirmed analytical result. Gas chromatography/mass spectrometry (GC/MS) is the preferred confirmatory method. Other chemical confirmation methods are available. Clinical consideration and professional judgement should be applied to any drug of abuse test result, particularly when preliminary positive results are used.    Positive results will be confirmed only at the physicians request. Unconfirmed screening results are to be used only for medical purposes (treatment).        ALCOHOL,MEDICAL (ETHANOL) - Normal   CK - Normal   LACTIC ACID, PLASMA - Normal   URINALYSIS, MICROSCOPIC - Normal   CBC W/ AUTO DIFFERENTIAL    Narrative:     The following orders were created for panel order CBC auto differential.  Procedure                               Abnormality         Status                     ---------                               -----------         ------                     CBC with Differential[[id number]]                           Final result                 Please view results for these tests on the individual orders.   CBC WITH DIFFERENTIAL   TYPE & SCREEN   ABORH RETYPE          Imaging  Results              X-Ray Femur 2 View Left (Final result)  Result time 09/25/23 21:26:24      Final result by Neftali Moran MD (09/25/23 21:26:24)                   Impression:      No acute osseous abnormality, fracture, or dislocation.    There is no significant degenerative change.      Electronically signed by: Neftali Moran  Date:    09/25/2023  Time:    21:26               Narrative:    EXAMINATION:  XR FEMUR 2 VIEW LEFT    CLINICAL HISTORY:  trauma;    TECHNIQUE:  Two views of the left femur.    COMPARISON:  No prior imaging available for comparison    FINDINGS:  There is no acute fracture, subluxation or dislocation.    Joints and interspaces appear maintained.    Osseous structures show normal bone mineral density.    Soft tissues are unremarkable.    There are no radiopaque foreign bodies.                                       X-Ray Tibia Fibula 2 View Left (Final result)  Result time 09/25/23 21:25:40      Final result by Neftali Moran MD (09/25/23 21:25:40)                   Impression:      No acute osseous abnormality, fracture, or dislocation.    There is no significant degenerative change.      Electronically signed by: Neftali Moran  Date:    09/25/2023  Time:    21:25               Narrative:    EXAMINATION:  XR TIBIA FIBULA 2 VIEW LEFT    CLINICAL HISTORY:  Injury, unspecified, initial encounter    TECHNIQUE:  Two views of the left tibia and fibula.    COMPARISON:  No prior imaging available for comparison    FINDINGS:  There is no acute fracture, subluxation or dislocation.    Joints and interspaces appear maintained.    Osseous structures show normal bone mineral density.    Soft tissues are unremarkable.    There are no radiopaque foreign bodies.                                       X-Ray Shoulder 2 or More Views Left (Final result)  Result time 09/25/23 21:22:17      Final result by Neftali Moran MD (09/25/23 21:22:17)                   Impression:      As  above.      Electronically signed by: Neftali Moran  Date:    09/25/2023  Time:    21:22               Narrative:    EXAMINATION:  XR SHOULDER COMPLETE 2 OR MORE VIEWS LEFT    CLINICAL HISTORY:  trauma;    TECHNIQUE:  Two or three views of the left shoulder were performed.    COMPARISON:  None    FINDINGS:  No displaced fracture.  No gross soft tissue abnormality.  The glenohumeral articulation is congruent on scapular Y-view.                                       X-Ray Forearm Left (Final result)  Result time 09/25/23 21:18:04      Final result by Neftali Moran MD (09/25/23 21:18:04)                   Impression:      No acute osseous abnormality, fracture, or dislocation.    There is no significant degenerative change.      Electronically signed by: Neftali Moran  Date:    09/25/2023  Time:    21:18               Narrative:    EXAMINATION:  XR FOREARM LEFT    CLINICAL HISTORY:  Injury, unspecified, initial encounter    TECHNIQUE:  Two views of the left forearm.    COMPARISON:  No prior imaging available for comparison    FINDINGS:  There is no acute fracture, subluxation or dislocation.    Joints and interspaces appear maintained.    Osseous structures show normal bone mineral density.    Soft tissues are unremarkable.    There are no radiopaque foreign bodies.                                       X-Ray Hand 3 view Left (Final result)  Result time 09/25/23 21:17:22      Final result by Neftali Moran MD (09/25/23 21:17:22)                   Impression:      No acute osseous abnormality, fracture, or dislocation.    There is no significant degenerative change.      Electronically signed by: Neftali Moran  Date:    09/25/2023  Time:    21:17               Narrative:    EXAMINATION:  XR HAND COMPLETE 3 VIEW LEFT    CLINICAL HISTORY:  trauma;    TECHNIQUE:  Radiographs of the left hand with AP, lateral and oblique  views.    COMPARISON:  No prior imaging available for comparison    FINDINGS:  There is no  acute fracture, subluxation or dislocation.    Joints and interspaces appear maintained.    Osseous structures show normal bone mineral density.    Soft tissues are unremarkable.    There are no radiopaque foreign bodies.                                       CT Chest Abdomen Pelvis With Contrast (xpd) (Final result)  Result time 09/25/23 20:51:42      Final result by Neftali Moran MD (09/25/23 20:51:42)                   Impression:      No acute traumatic injury appreciated within the chest abdomen or pelvis.    Unknown metallic foreign body adjacent to the right femoral neck.      Electronically signed by: Neftali Moran  Date:    09/25/2023  Time:    20:51               Narrative:    EXAMINATION:  CT CHEST ABDOMEN PELVIS WITH CONTRAST (XPD)    CLINICAL HISTORY:  Trauma;    TECHNIQUE:  Axial images of the chest, abdomen, and pelvis were obtained With Contrast. Sagittal and coronal reconstructed images were available for review.    Automatic exposure control was utilized to reduce the patient's radiation dose.    DLP = 416    COMPARISON:  No prior images available for comparison.    FINDINGS:  AORTA: The thoracoabdominal aorta is normal in course and caliber.    HEART: Normal size. No pericardial effusion.    THYROID GLAND: The thyroid is not enlarged. There are no nodules identified.    AIRWAYS: Trachea is midline and tracheobronchial tree is patent.    LUNGS: There are no masses or nodules identified. No pleural effusion or pneumothorax.    THROACIC LYMPH NODES: There is no significant mediastinal, axillary or hilar lymphadenopathy.    HEPATOBILIARY: No focal hepatic lesion is identified, The gallbladder is normal.    SPLEEN: Normal    PANCREAS: No focal masses or ductal dilatation.    ADRENALS: No adrenal nodules.    KIDNEYS: The right kidney demonstrates no stone, hydronephrosis, or hydroureter. No focal mass identified. The left kidney demonstrates no stone, hydronephrosis, or hydroureter. No focal mass  identified.    ABDOMINAL LYMPHADENOPATHY/RETROPERITONEUM: There is no retroperitoneal lymphadenopathy.    BOWEL: No acute bowel related abnormalities. No evidence of appendiceal inflammation.    PELVIC VISCERA: Normal. No pelvic mass.    PELVIC LYMPH NODES: No lymphadenopathy.    PERITONEUM/ BODY WALL: No ascites or implant.    SKELETAL: No aggressive appearing lytic/blastic lesion. No acute fractures, subluxations or dislocations.                                       CT Cervical Spine Without Contrast (Final result)  Result time 09/25/23 20:46:51      Final result by Neftali Moran MD (09/25/23 20:46:51)                   Impression:      1. No acute cervical spine abnormality identified.    2. Ligament, spinal cord and/or vascular abnormalities cannot be excluded on the basis of this examination.      Electronically signed by: Neftali Moran  Date:    09/25/2023  Time:    20:46               Narrative:    EXAMINATION:  CT CERVICAL SPINE WITHOUT CONTRAST    CLINICAL HISTORY:  Trauma;    TECHNIQUE:  CT of the cervical spine Without contrast. Sagittal and coronal reconstructions were performed on the source images.    Automatic exposure control was utilized to reduce the patient's radiation dose.    DLP = 1369    COMPARISON:  No prior imaging available for comparison.    FINDINGS:  There is no acute fracture, subluxation, or dislocation. Limited detail regarding cervical discs, but there is no finding seen to suggest acute disc herniation. The lateral masses are symmetric about the dens. There is normal lordotic curvature of the cervical spine.    The prevertebral soft tissues are normal. There is no lymphadenopathy.                                       CT Head Without Contrast (Final result)  Result time 09/25/23 20:44:28      Final result by Neftali Moran MD (09/25/23 20:44:28)                   Impression:      No acute intracranial abnormality identified.      Electronically signed by: Neftali  Dean  Date:    09/25/2023  Time:    20:44               Narrative:    EXAMINATION:  CT HEAD WITHOUT CONTRAST    CLINICAL HISTORY:  Trauma;    TECHNIQUE:  Low dose axial images were obtained through the head.  Coronal and sagittal reformations were also performed. Contrast was not administered.    Automatic exposure control was utilized to reduce the patient's radiation dose.    DLP= 1369    COMPARISON:  None.    FINDINGS:  No acute intracranial hemorrhage, edema or mass. No acute parenchymal abnormality.    There is no hydrocephalus, evidence of herniation or midline shift. The ventricles and sulci are normal.    There is normal gray white differentiation.    The osseous structures are normal.    The mastoid air cells are clear.    The auditory canals are patent bilaterally.    The globes and orbital contents are normal bilaterally.    The visualized maxillary, ethmoid and sphenoid sinuses are clear.                                       X-Ray Pelvis Routine AP (Final result)  Result time 09/25/23 20:44:57      Final result by Neftali Moran MD (09/25/23 20:44:57)                   Impression:      No acute osseous abnormality, fracture, or dislocation.    There is no significant degenerative change.      Electronically signed by: Neftali Moran  Date:    09/25/2023  Time:    20:44               Narrative:    EXAMINATION:  XR PELVIS ROUTINE AP    CLINICAL HISTORY:  r/o bleeding or hemorrhage;    TECHNIQUE:  Single view of the pelvis.    COMPARISON:  No prior imaging available for comparison    FINDINGS:  There is no acute fracture, subluxation or dislocation.    Joints and interspaces appear maintained.    Osseous structures show normal bone mineral density.    Soft tissues are unremarkable.    There are no radiopaque foreign bodies.                                       X-Ray Chest 1 View (Final result)  Result time 09/25/23 20:45:22      Final result by Neftali Moran MD (09/25/23 20:45:22)                    Impression:      No acute cardiopulmonary process.      Electronically signed by: Neftali Moran  Date:    09/25/2023  Time:    20:45               Narrative:    EXAMINATION:  XR CHEST 1 VIEW    CLINICAL HISTORY:  r/o bleeding or hemorrhage;    TECHNIQUE:  Single view of the chest    COMPARISON:  No prior imaging available for comparison.    FINDINGS:  No focal opacification, pleural effusion, or pneumothorax.    The cardiomediastinal silhouette is within normal limits.    No acute osseous abnormality.                                       Medications   Tdap (BOOSTRIX) vaccine injection 0.5 mL (0.5 mLs Intramuscular Given 9/25/23 2022)   methocarbamoL injection 1,000 mg (1,000 mg Intravenous Given 9/25/23 2049)   fentaNYL injection 50 mcg (50 mcg Intravenous Given 9/25/23 2049)   ondansetron injection 4 mg (4 mg Intravenous Given 9/25/23 2049)   iopamidoL (ISOVUE-370) injection 100 mL (100 mLs Intravenous Given 9/25/23 2038)   lactated ringers infusion (0 mL/hr Intravenous Stopped 9/25/23 2225)   lactated ringers bolus 1,000 mL (0 mLs Intravenous Stopped 9/25/23 2224)     Medical Decision Making  Problems Addressed:  Crush injury arm, left, initial encounter: acute illness or injury  Trauma: acute illness or injury    Amount and/or Complexity of Data Reviewed  Radiology: ordered.    Risk  Prescription drug management.            Scribe Attestation:   Scribe #1: I performed the above scribed service and the documentation accurately describes the services I performed. I attest to the accuracy of the note.    Attending Attestation:           Physician Attestation for Scribe:  Physician Attestation Statement for Scribe #1: I, Adolfo Ann MD, reviewed documentation, as scribed by Ishaan Martinez in my presence, and it is both accurate and complete.         Differential diagnosis (includes but is not limited to):   Crush injury, compartment syndrome, rhabdomyolysis, fracture, dislocation    MDM  Narrative  23-year-old male presents as a level 1 trauma activation after a crush injury.  He states he was working under a car when it slipped from the [place] and fell onto him.  He reports pain diffusely throughout his left side.  Trauma surgery at bedside per protocol.  Labs are pending.  He does have some abrasions, Tdap updated in trauma [place].  Continue pain and nausea control as needed, narcotics as needed.  Telemetry monitoring independently reviewed and shows a sinus rhythm with a heart rate in the 90s.  CT scans are pending to evaluate for acute traumatic injury.  X-rays are pending at sites of pain.    Update:  Labs reviewed and are overall unremarkable, CK is negative.  CT scans and x-rays negative for acute traumatic injury.  Patient reports his pain is significantly improved after medications given in the emergency department.  He is ambulatory at his baseline with a steady gait and is tolerating oral intake well.  Patient is to follow up with his primary care physician for further evaluation and management of his symptoms.  Strict return precautions have been discussed the patient has verbalized understanding.  Prescriptions for symptom control provided.    Dispo:  Discharge    My independent radiology interpretation:  As above  Point of care US (independently performed and interpreted):   Decision rules/clinical scoring:     Sepsis Perfusion Assessment:     Amount and/or Complexity of Data Reviewed  Independent historian: EMS   Summary of history: Per EMS pt was under car when jack slipped and engine compartment fell on pt's chest and pelvis, vitals stable en route, no obvious deformities.  External data reviewed: notes from previous admissions, notes from previous ED visits, and notes from clinic visits  Summary of data reviewed:  Prior records reviewed once chart merged  Risk and benefits of testing: discussed   Labs: ordered and reviewed  Radiology: ordered and independent interpretation performed  (see above or ED course)  ECG/medicine tests: ordered and independent interpretation performed (see above or ED course)  Discussion of management or test interpretation with external provider(s): discussed with Trauma surgery consultant   Summary of discussion:  As above.  Patient cleared for discharge by Trauma    Risk  Parenteral controlled substances   Drug therapy requiring intense monitoring for toxicity   Decision regarding hospitalization  Shared decision making     Critical Care  none    Data Reviewed/Counseling: I have personally reviewed the patient's vital signs, nursing notes, and other relevant tests, information, and imaging. I had a detailed discussion regarding the historical points, exam findings, and any diagnostic results supporting the discharge diagnosis. I personally performed the history, PE, MDM and procedures as documented above and agree with the scribe's documentation.    Portions of this note were dictated using voice recognition software. Although it was reviewed for accuracy, some inherent voice recognition errors may have occurred and may be present in this document.       ED Course as of 10/03/23 1930   Mon Sep 25, 2023   2030 X-Ray Pelvis Routine AP  Independently visualized/reviewed by me during the ED visit.  - No fracture or dislocation [MC]   2030 X-Ray Chest 1 View  Independently visualized/reviewed by me during the ED visit.  - No fracture, no PTX [MC]   2100 X-Ray Femur 2 View Left  Independently visualized/reviewed by me during the ED visit.  - No fx or dislocation [MC]   2100 X-Ray Tibia Fibula 2 View Left  Independently visualized/reviewed by me during the ED visit.  - No fx or dislocation [MC]   2100 X-Ray Shoulder 2 or More Views Left  Independently visualized/reviewed by me during the ED visit.  - No fracture or dislocation [MC]   2100 X-Ray Forearm Left  Independently visualized/reviewed by me during the ED visit.  - No fracture or dislocation [MC]   2100 X-Ray Hand 3  view Left  Independently visualized/reviewed by me during the ED visit.  - No fracture or dislocation []   Tue Sep 26, 2023   0118 I have reassessed the patient.  Patient is resting comfortably, no acute distress.  Vital signs stable.  Discussed all results including incidental findings.  Discussed need for follow up and discussed return precautions.  Answered all questions at this time.  Cleared for discharge by Trauma surgery.  Patient is ambulatory at his baseline with a steady gait.  Patient is tolerating oral intake well.  Hemodynamically stable for continued outpatient management. Patient verbalized understanding and agreed to plan.  [MC]      ED Course User Index  [] Adolfo Ann MD                    Clinical Impression:   Final diagnoses:  [T14.90XA] Trauma  [S47.2XXA] Crush injury arm, left, initial encounter (Primary)        ED Disposition Condition    Discharge Stable          ED Prescriptions       Medication Sig Dispense Start Date End Date Auth. Provider    methocarbamoL (ROBAXIN) 500 MG Tab () Take 2 tablets (1,000 mg total) by mouth 3 (three) times daily. for 5 days 30 tablet 2023 10/1/2023 Adolfo Ann MD    ibuprofen (ADVIL,MOTRIN) 600 MG tablet Take 1 tablet (600 mg total) by mouth every 6 (six) hours as needed for Pain. 20 tablet 2023 -- Adolfo Ann MD    HYDROcodone-acetaminophen (NORCO) 5-325 mg per tablet Take 1 tablet by mouth every 6 (six) hours as needed for Pain. 12 tablet 2023 -- Adolfo Ann MD    ondansetron (ZOFRAN-ODT) 4 MG TbDL Take 1 tablet (4 mg total) by mouth every 6 (six) hours as needed (Nausea). 12 tablet 2023 -- Adolfo Ann MD          Follow-up Information    None          Adolfo Ann MD  10/03/23 1930

## 2023-09-26 NOTE — DISCHARGE INSTRUCTIONS

## 2023-09-28 ENCOUNTER — HOSPITAL ENCOUNTER (EMERGENCY)
Facility: HOSPITAL | Age: 23
Discharge: HOME | End: 2023-09-28
Attending: EMERGENCY MEDICINE
Payer: COMMERCIAL

## 2023-09-28 VITALS
WEIGHT: 146 LBS | OXYGEN SATURATION: 95 % | HEIGHT: 71 IN | RESPIRATION RATE: 18 BRPM | SYSTOLIC BLOOD PRESSURE: 129 MMHG | DIASTOLIC BLOOD PRESSURE: 87 MMHG | HEART RATE: 90 BPM | BODY MASS INDEX: 20.44 KG/M2 | TEMPERATURE: 98 F

## 2023-09-28 DIAGNOSIS — T14.90XA INJURY: ICD-10-CM

## 2023-09-28 DIAGNOSIS — R10.84 GENERALIZED ABDOMINAL PAIN: Primary | ICD-10-CM

## 2023-09-28 DIAGNOSIS — R04.2 HEMOPTYSIS: ICD-10-CM

## 2023-09-28 LAB
ALBUMIN SERPL-MCNC: 5 G/DL (ref 3.5–5)
ALBUMIN/GLOB SERPL: 1.7 RATIO (ref 1.1–2)
ALP SERPL-CCNC: 83 UNIT/L (ref 40–150)
ALT SERPL-CCNC: 15 UNIT/L (ref 0–55)
AMPHET UR QL SCN: NEGATIVE
APPEARANCE UR: CLEAR
AST SERPL-CCNC: 22 UNIT/L (ref 5–34)
BACTERIA #/AREA URNS AUTO: NORMAL /HPF
BARBITURATE SCN PRESENT UR: NEGATIVE
BASOPHILS # BLD AUTO: 0.02 X10(3)/MCL
BASOPHILS NFR BLD AUTO: 0.3 %
BENZODIAZ UR QL SCN: NEGATIVE
BILIRUB SERPL-MCNC: 0.6 MG/DL
BILIRUB UR QL STRIP.AUTO: NEGATIVE
BUN SERPL-MCNC: 8 MG/DL (ref 8.9–20.6)
CALCIUM SERPL-MCNC: 10 MG/DL (ref 8.4–10.2)
CANNABINOIDS UR QL SCN: NEGATIVE
CHLORIDE SERPL-SCNC: 106 MMOL/L (ref 98–107)
CO2 SERPL-SCNC: 27 MMOL/L (ref 22–29)
COCAINE UR QL SCN: NEGATIVE
COLOR UR AUTO: YELLOW
CREAT SERPL-MCNC: 0.91 MG/DL (ref 0.73–1.18)
EOSINOPHIL # BLD AUTO: 0 X10(3)/MCL (ref 0–0.9)
EOSINOPHIL NFR BLD AUTO: 0 %
ERYTHROCYTE [DISTWIDTH] IN BLOOD BY AUTOMATED COUNT: 12.6 % (ref 11.5–17)
FENTANYL UR QL SCN: NEGATIVE
GFR SERPLBLD CREATININE-BSD FMLA CKD-EPI: >60 MLS/MIN/1.73/M2
GLOBULIN SER-MCNC: 2.9 GM/DL (ref 2.4–3.5)
GLUCOSE SERPL-MCNC: 100 MG/DL (ref 74–100)
GLUCOSE UR QL STRIP.AUTO: NEGATIVE
HCT VFR BLD AUTO: 46.2 % (ref 42–52)
HGB BLD-MCNC: 16 G/DL (ref 14–18)
IMM GRANULOCYTES # BLD AUTO: 0.03 X10(3)/MCL (ref 0–0.04)
IMM GRANULOCYTES NFR BLD AUTO: 0.4 %
INR PPP: 1.1
KETONES UR QL STRIP.AUTO: NEGATIVE
LEUKOCYTE ESTERASE UR QL STRIP.AUTO: NEGATIVE
LYMPHOCYTES # BLD AUTO: 1.28 X10(3)/MCL (ref 0.6–4.6)
LYMPHOCYTES NFR BLD AUTO: 17.3 %
MCH RBC QN AUTO: 30.5 PG (ref 27–31)
MCHC RBC AUTO-ENTMCNC: 34.6 G/DL (ref 33–36)
MCV RBC AUTO: 88 FL (ref 80–94)
MDMA UR QL SCN: NEGATIVE
MONOCYTES # BLD AUTO: 0.35 X10(3)/MCL (ref 0.1–1.3)
MONOCYTES NFR BLD AUTO: 4.7 %
NEUTROPHILS # BLD AUTO: 5.7 X10(3)/MCL (ref 2.1–9.2)
NEUTROPHILS NFR BLD AUTO: 77.3 %
NITRITE UR QL STRIP.AUTO: NEGATIVE
NRBC BLD AUTO-RTO: 0 %
OPIATES UR QL SCN: NEGATIVE
PCP UR QL: NEGATIVE
PH UR STRIP.AUTO: 8.5 [PH]
PH UR: 8.5 [PH] (ref 3–11)
PLATELET # BLD AUTO: 287 X10(3)/MCL (ref 130–400)
PMV BLD AUTO: 9.4 FL (ref 7.4–10.4)
POTASSIUM SERPL-SCNC: 3.9 MMOL/L (ref 3.5–5.1)
PROT SERPL-MCNC: 7.9 GM/DL (ref 6.4–8.3)
PROT UR QL STRIP.AUTO: NEGATIVE
PROTHROMBIN TIME: 14.2 SECONDS (ref 12.5–14.5)
RBC # BLD AUTO: 5.25 X10(6)/MCL (ref 4.7–6.1)
RBC #/AREA URNS AUTO: <5 /HPF
RBC UR QL AUTO: NEGATIVE
SODIUM SERPL-SCNC: 142 MMOL/L (ref 136–145)
SP GR UR STRIP.AUTO: 1.02 (ref 1–1.03)
SQUAMOUS #/AREA URNS AUTO: <5 /HPF
UROBILINOGEN UR STRIP-ACNC: 0.2
WBC # SPEC AUTO: 7.38 X10(3)/MCL (ref 4.5–11.5)
WBC #/AREA URNS AUTO: <5 /HPF

## 2023-09-28 PROCEDURE — 96375 TX/PRO/DX INJ NEW DRUG ADDON: CPT

## 2023-09-28 PROCEDURE — 25500020 PHARM REV CODE 255

## 2023-09-28 PROCEDURE — 63600175 PHARM REV CODE 636 W HCPCS

## 2023-09-28 PROCEDURE — 85610 PROTHROMBIN TIME: CPT | Performed by: NURSE PRACTITIONER

## 2023-09-28 PROCEDURE — 80307 DRUG TEST PRSMV CHEM ANLYZR: CPT | Performed by: NURSE PRACTITIONER

## 2023-09-28 PROCEDURE — 25000003 PHARM REV CODE 250

## 2023-09-28 PROCEDURE — 99285 EMERGENCY DEPT VISIT HI MDM: CPT | Mod: 25

## 2023-09-28 PROCEDURE — 81001 URINALYSIS AUTO W/SCOPE: CPT | Performed by: NURSE PRACTITIONER

## 2023-09-28 PROCEDURE — 96361 HYDRATE IV INFUSION ADD-ON: CPT

## 2023-09-28 PROCEDURE — 96374 THER/PROPH/DIAG INJ IV PUSH: CPT

## 2023-09-28 PROCEDURE — 80053 COMPREHEN METABOLIC PANEL: CPT | Performed by: NURSE PRACTITIONER

## 2023-09-28 PROCEDURE — 85025 COMPLETE CBC W/AUTO DIFF WBC: CPT | Performed by: NURSE PRACTITIONER

## 2023-09-28 RX ORDER — MORPHINE SULFATE 4 MG/ML
4 INJECTION, SOLUTION INTRAMUSCULAR; INTRAVENOUS
Status: COMPLETED | OUTPATIENT
Start: 2023-09-28 | End: 2023-09-28

## 2023-09-28 RX ORDER — HYDROMORPHONE HYDROCHLORIDE 2 MG/ML
0.5 INJECTION, SOLUTION INTRAMUSCULAR; INTRAVENOUS; SUBCUTANEOUS
Status: COMPLETED | OUTPATIENT
Start: 2023-09-28 | End: 2023-09-28

## 2023-09-28 RX ORDER — FAMOTIDINE 20 MG/1
20 TABLET, FILM COATED ORAL 2 TIMES DAILY
Qty: 20 TABLET | Refills: 0 | Status: SHIPPED | OUTPATIENT
Start: 2023-09-28 | End: 2023-10-08

## 2023-09-28 RX ORDER — ONDANSETRON 4 MG/1
4 TABLET, ORALLY DISINTEGRATING ORAL
Status: COMPLETED | OUTPATIENT
Start: 2023-09-28 | End: 2023-09-28

## 2023-09-28 RX ADMIN — MORPHINE SULFATE 4 MG: 4 INJECTION INTRAVENOUS at 05:09

## 2023-09-28 RX ADMIN — HYDROMORPHONE HYDROCHLORIDE 0.5 MG: 2 INJECTION INTRAMUSCULAR; INTRAVENOUS; SUBCUTANEOUS at 06:09

## 2023-09-28 RX ADMIN — SODIUM CHLORIDE, POTASSIUM CHLORIDE, SODIUM LACTATE AND CALCIUM CHLORIDE 1000 ML: 600; 310; 30; 20 INJECTION, SOLUTION INTRAVENOUS at 03:09

## 2023-09-28 RX ADMIN — IOPAMIDOL 100 ML: 755 INJECTION, SOLUTION INTRAVENOUS at 03:09

## 2023-09-28 RX ADMIN — ONDANSETRON 4 MG: 4 TABLET, ORALLY DISINTEGRATING ORAL at 05:09

## 2023-09-28 NOTE — DISCHARGE INSTRUCTIONS
Retrieve previously prescribed medications from pharmacy.     Thanks for letting us take care of you today!  It is our goal to give you courteous care and to keep you comfortable and informed, if you have any questions before you leave I will be happy to try and answer them.    Here is some advice after your visit:      Your visit in the emergency department is NOT definitive care - please follow-up with your primary care doctor and/or specialist within 1 week.  Please return if you have any worsening in your condition or if you have any other concerns.    If you had radiology exams like an XRAY or CT in the emergency Department the interpreation on them may be preliminary - there may be less time sensitive findings on the reports please obtain these reports within 24 hours from the hospital or by using your out on your mobile phone to access records.  Bring these to your primary care doctor and/or specialist for further review of incidental findings.    Please review any LAB WORK from your visit today with your primary care physician.

## 2023-09-28 NOTE — CONSULTS
Trauma Surgery   Consult    Patient Name: Adiel Real  YOB: 2000  Date: 09/28/2023 5:31 PM  Date of Admission: 9/28/2023  HD#0  POD#* No surgery found *    PRESENTING HISTORY   Chief Complaint/Reason for Admission: <principal problem not specified>    History of Present Illness:  Adiel Real is a 23-year-old male with past medical history significant for substance abuse, ADHD and schizophrenia who presents to the ED with a chief complaint of generalized body aches, abdominal pain, dark red blood in stool and coughing up blood for the last 2 days.  Of note he was seen as a level 1 trauma on 09/25 following a crush injury.  Workup revealed no acute traumatic injury, heterogeneous hepatic enhancement with recommendations to follow up abdominal MRI, hemoglobin 16, hematocrit 46.2, hemoccult test negative.    Review of Systems:  12 point ROS negative except as stated in HPI    PAST HISTORY:   Past medical history:  Past Medical History:   Diagnosis Date    Addiction to drug     ADHD (attention deficit hyperactivity disorder)     Pt stated when I was 10 years old.    Alcohol abuse     Anxiety     Appendicitis 04/21/2019    Bronchitis     Depression     History of psychiatric hospitalization     Hx of psychiatric care     Mononucleosis     Schizophrenia, unspecified 06/14/2021    Sleep difficulties     Suicide attempt        Past surgical history:  Past Surgical History:   Procedure Laterality Date    FOOT SURGERY      FOOT SURGERY      LAPAROSCOPIC APPENDECTOMY N/A 4/22/2019    Procedure: APPENDECTOMY, LAPAROSCOPIC;  Surgeon: Tre Curtis MD;  Location: [facility];  Service: General;  Laterality: N/A;       Family history:  Family History   Problem Relation Age of Onset    Anxiety disorder Mother     Depression Mother     Drug abuse Father     No Known Problems Sister     No Known Problems Paternal Aunt     No Known Problems Paternal Uncle     No Known Problems Maternal  Grandfather     Dementia Maternal Grandmother     No Known Problems Paternal Grandmother     No Known Problems Cousin     No Known Problems Sister        Social history:  Social History     Socioeconomic History    Marital status: Single    Number of children: 5   Tobacco Use    Smoking status: Every Day     Current packs/day: 0.50     Types: Cigarettes    Smokeless tobacco: Never   Substance and Sexual Activity    Alcohol use: Yes     Alcohol/week: 24.0 standard drinks of alcohol     Types: 24 Cans of beer per week    Drug use: Yes     Types: Marijuana, Methamphetamines, Amphetamines     Comment: Meth- used daily    Sexual activity: Not Currently     Partners: Female   Other Topics Concern    Patient feels they ought to cut down on drinking/drug use Yes    Patient annoyed by others criticizing their drinking/drug use No    Patient has felt bad or guilty about drinking/drug use Yes    Patient has had a drink/used drugs as an eye opener in the AM No   Social History Narrative    Unable to confirm number of children as pt continued to state all 5 children  in his arms.      Social History     Tobacco Use   Smoking Status Every Day    Current packs/day: 0.50    Types: Cigarettes   Smokeless Tobacco Never      Social History     Substance and Sexual Activity   Alcohol Use Yes    Alcohol/week: 24.0 standard drinks of alcohol    Types: 24 Cans of beer per week        MEDICATIONS & ALLERGIES:   Allergies: Review of patient's allergies indicates:  No Known Allergies  Home Meds:   Current Outpatient Medications   Medication Instructions    buPROPion (WELLBUTRIN SR) 100 mg, Oral, 2 times daily    mirtazapine (REMERON) 30 mg, Oral, Nightly      No current facility-administered medications on file prior to encounter.     Current Outpatient Medications on File Prior to Encounter   Medication Sig Dispense Refill    buPROPion (WELLBUTRIN SR) 100 MG TBSR 12 hr tablet Take 1 tablet (100 mg total) by mouth 2 (two) times daily. 60  "tablet 2    mirtazapine (REMERON) 30 MG tablet Take 1 tablet (30 mg total) by mouth every evening. 30 tablet 2      No current facility-administered medications on file prior to encounter.     Current Outpatient Medications on File Prior to Encounter   Medication Sig    buPROPion (WELLBUTRIN SR) 100 MG TBSR 12 hr tablet Take 1 tablet (100 mg total) by mouth 2 (two) times daily.    mirtazapine (REMERON) 30 MG tablet Take 1 tablet (30 mg total) by mouth every evening.     Scheduled Meds:  Continuous Infusions:  PRN Meds:    OBJECTIVE:   Vital Signs:  VITAL SIGNS: 24 HR MIN & MAX LAST   Temp  Min: 98 °F (36.7 °C)  Max: 98 °F (36.7 °C)  98 °F (36.7 °C)   BP  Min: 129/87  Max: 147/97  129/87    Pulse  Min: 90  Max: 120  90    Resp  Min: 18  Max: 20  18    SpO2  Min: 95 %  Max: 99 %  95 %      HT: 5' 11" (180.3 cm)  WT: 66.2 kg (146 lb)  BMI: 20.4   Intake/output: No intake/output data recorded.     Lines/drains/airway:       Peripheral IV - Single Lumen 09/28/23 1515 18 G Anterior;Left Forearm (Active)   Number of days: 0       Physical Exam:  General:  Well developed, well nourished, no acute distress  HEENT:  Normocephalic, atraumatic  CV:  RR, 2+ DPs bilaterally  Resp/chest: NWOB  GI:  Abdomen soft, non-distended, generalized tenderness, no rebound or guarding  MSK:  No muscle atrophy, cyanosis, peripheral edema, moving all extremities spontaneously  Neuro: GCS 15. CNII-XII grossly intact, alert and oriented to person, place, and time. Strength and motor function grossly intact to all extremities, sensation intact to all extremities.      Labs:  Troponin:  No results for input(s): "TROPONINI" in the last 72 hours.  CBC:  Recent Labs     09/28/23  1215   WBC 7.38   RBC 5.25   HGB 16.0   HCT 46.2      MCV 88.0   MCH 30.5   MCHC 34.6     CMP:  Recent Labs     09/28/23  1215   CALCIUM 10.0   ALBUMIN 5.0      K 3.9   CO2 27   BUN 8.0*   CREATININE 0.91   ALKPHOS 83   ALT 15   AST 22   BILITOT 0.6     Lactic " "Acid:  No results for input(s): "LACTATE" in the last 72 hours.  ETOH:  No results for input(s): "ETHANOL" in the last 72 hours.   Urine Drug Screen:  Recent Labs     09/28/23  1204   COCAINE Negative   OPIATE Negative   FENTANYL Negative   MDMA Negative      ABG  No results for input(s): "PH", "PO2", "PCO2", "HCO3", "BE" in the last 168 hours.      Diagnostic Results:  CT Chest Abdomen Pelvis With Contrast (xpd)   Final Result      No acute traumatic injury identified.      Heterogeneous hepatic enhancement is similar to prior CT.  This can be seen with hepatic venous congestion, but no periportal edema or other findings for hepatic congestion identified.  Depending on patient risk factors for chronic liver disease, follow-up abdominal MRI can be pursued to ensure this is a perfusional finding and better assess for infiltrative hepatic processes.         Electronically signed by: Siddhartha Goss   Date:    09/28/2023   Time:    16:31      X-Ray Chest PA And Lateral   Final Result      No acute pulmonary process appreciated.         Electronically signed by: Siddhartha Goss   Date:    09/28/2023   Time:    12:39          ASSESSMENT & PLAN:    Adiel Real is a 23 year old male s/p crush injury on 9/25 w/ abdominal pain, dark blood in stool, and hemoptysis x2 days    CT imaging and case discussed with attending.  No evidence of pulmonary contusion, laceration, or evidence of bowel injury.  Labs reviewed. Hemoglobin and hematocrit within normal limits.  Hemodynamically stable, vitals stable.  No acute traumatic surgical intervention required at this time.  Recommend follow up with outpatient primary care physician with return precautions provided. Disposition per ED.         9/28/2023 6:14 PM     The above findings, diagnostics, and treatment plan were discussed with Dr. Khanh Shafer who will follow with further assessments and recommendations. Please call with any questions, concerns, or clinical status " changes.  This note/OR report was created with the assistance of  voice recognition software or phone  dictation.  There may be transcription errors as a result of using this technology however minimal. Effort has been made to assure accuracy of transcription but any obvious errors or omissions should be clarified with the author of the document.

## 2023-09-28 NOTE — ED PROVIDER NOTES
Encounter Date: 9/28/2023       History     Chief Complaint   Patient presents with    Abdominal Pain     Pt. C/o generalized body aches, abdominal pain, dark red blood in stool, and coughing up blood  x 2 days. Pt. Reports has not taken Suboxone x 13 days, not currently taking anything for pain.      23 y.o. White male with a history of substance abuse, ADHD, and schizophrenia presents to Emergency Department with a chief complaint of abdominal pain. Symptoms began 2 days ago and have been constant since onset. Patient involved in Level 1 Trauma on 09/25/23 after a van fell on top of him. Associated symptoms include hemoptysis, generalized pain, and noticed a blood clot in his stool on yesterday. Symptoms are aggravated with palpation and there are no alleviating factors. The patient denies CP, SOB, wheezing, fever, or blood thinner use. States he has not been on Suboxone in 13 days; stopped taking it. No other reported symptoms at this time      The history is provided by the patient. No  was used.   Abdominal Pain  The current episode started two days ago. The onset of the illness was abrupt. The problem has not changed since onset.The abdominal pain is generalized. The other symptoms of the illness do not include fever, fatigue, shortness of breath, nausea, diarrhea or dysuria.   Symptoms associated with the illness do not include chills, hematuria, frequency or back pain.     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    Alcohol abuse     Anxiety     Appendicitis 04/21/2019    Bronchitis     Depression     History of psychiatric hospitalization     Hx of psychiatric care     Mononucleosis     Schizophrenia, unspecified 06/14/2021    Sleep difficulties     Suicide attempt      Past Surgical History:   Procedure Laterality Date     FOOT SURGERY      FOOT SURGERY      LAPAROSCOPIC APPENDECTOMY N/A 4/22/2019    Procedure: APPENDECTOMY, LAPAROSCOPIC;  Surgeon: Tre Curtis MD;  Location: [facility];  Service: General;  Laterality: N/A;     Family History   Problem Relation Age of Onset    Anxiety disorder Mother     Depression Mother     Drug abuse Father     No Known Problems Sister     No Known Problems Paternal Aunt     No Known Problems Paternal Uncle     No Known Problems Maternal Grandfather     Dementia Maternal Grandmother     No Known Problems Paternal Grandmother     No Known Problems Cousin     No Known Problems Sister      Social History     Tobacco Use    Smoking status: Every Day     Current packs/day: 0.50     Types: Cigarettes    Smokeless tobacco: Never   Substance Use Topics    Alcohol use: Yes     Alcohol/week: 24.0 standard drinks of alcohol     Types: 24 Cans of beer per week    Drug use: Yes     Types: Marijuana, Methamphetamines, Amphetamines     Comment: Meth- used daily     Review of Systems   Constitutional:  Negative for chills, fatigue and fever.   Eyes:  Negative for photophobia and visual disturbance.   Respiratory:  Negative for cough, shortness of breath, wheezing and stridor.    Cardiovascular:  Negative for chest pain, palpitations and leg swelling.   Gastrointestinal:  Positive for abdominal pain and blood in stool. Negative for diarrhea and nausea.   Genitourinary:  Negative for difficulty urinating, dysuria, frequency and hematuria.   Musculoskeletal:  Positive for arthralgias. Negative for back pain.   All other systems reviewed and are negative.      Physical Exam     Initial Vitals [09/28/23 1159]   BP Pulse Resp Temp SpO2   (!) 147/97 (!) 120 20 98 °F (36.7 °C) 99 %      MAP       --         Physical Exam    Nursing note and vitals reviewed.  Constitutional: He appears well-developed and well-nourished. He is not diaphoretic. He is cooperative.  Non-toxic appearance. No distress.   HENT:   Head:  Normocephalic and atraumatic.   Right Ear: External ear normal.   Left Ear: External ear normal.   Nose: Nose normal.   Mouth/Throat: Oropharynx is clear and moist.   Eyes: Conjunctivae and EOM are normal. Pupils are equal, round, and reactive to light.   Neck: Neck supple.   Normal range of motion.  Cardiovascular:  Normal rate, regular rhythm, S1 normal, S2 normal, normal heart sounds and intact distal pulses.           Pulmonary/Chest: Effort normal and breath sounds normal. No respiratory distress. He has no wheezes.   Abdominal: Abdomen is soft. Bowel sounds are normal. There is generalized abdominal tenderness.   Genitourinary:    Rectum normal.   Rectum:      Guaiac result negative.      No tenderness or abnormal anal tone.   Guaiac negative stool.    Genitourinary Comments: Exam performed with WHITLYE Espinoza at bedside. Patient tolerated well.      Musculoskeletal:         General: Tenderness present. Normal range of motion.      Cervical back: Normal range of motion and neck supple.      Comments: C/o generalized pain to entire body. Full 5/5 ROM noted. CMS intact. All other adjacent joints otherwise normal.        Neurological: He is alert and oriented to person, place, and time. He has normal strength. No sensory deficit. GCS score is 15. GCS eye subscore is 4. GCS verbal subscore is 5. GCS motor subscore is 6.   Skin: Skin is warm and dry. Capillary refill takes less than 2 seconds.   Psychiatric: He has a normal mood and affect. Thought content normal.         ED Course   Procedures  Labs Reviewed   COMPREHENSIVE METABOLIC PANEL - Abnormal; Notable for the following components:       Result Value    Blood Urea Nitrogen 8.0 (*)     All other components within normal limits   URINALYSIS, REFLEX TO URINE CULTURE - Normal   DRUG SCREEN, URINE (BEAKER) - Normal    Narrative:     Cut off concentrations:    Amphetamines - 1000 ng/ml  Barbiturates - 200 ng/ml  Benzodiazepine - 200 ng/ml  Cannabinoids (THC) - 50  ng/ml  Cocaine - 300 ng/ml  Fentanyl - 1.0 ng/ml  MDMA - 500 ng/ml  Opiates - 300 ng/ml   Phencyclidine (PCP) - 25 ng/ml    Specimen submitted for drug analysis and tested for pH and specific gravity in order to evaluate sample integrity. Suspect tampering if specific gravity is <1.003 and/or pH is not within the range of 4.5 - 8.0  False negatives may result form substances such as bleach added to urine.  False positives may result for the presence of a substance with similar chemical structure to the drug or its metabolite.    This test provides only a PRELIMINARY analytical test result. A more specific alternate chemical method must be used in order to obtain a confirmed analytical result. Gas chromatography/mass spectrometry (GC/MS) is the preferred confirmatory method. Other chemical confirmation methods are available. Clinical consideration and professional judgement should be applied to any drug of abuse test result, particularly when preliminary positive results are used.    Positive results will be confirmed only at the physicians request. Unconfirmed screening results are to be used only for medical purposes (treatment).        PROTIME-INR - Normal   URINALYSIS, MICROSCOPIC - Normal   CBC W/ AUTO DIFFERENTIAL    Narrative:     The following orders were created for panel order CBC Auto Differential.  Procedure                               Abnormality         Status                     ---------                               -----------         ------                     CBC with Differential[[id number]]                            Final result                 Please view results for these tests on the individual orders.   CBC WITH DIFFERENTIAL          Imaging Results              CT Chest Abdomen Pelvis With Contrast (xpd) (Final result)  Result time 09/28/23 16:31:55      Final result by Siddhartha Goss MD (09/28/23 16:31:55)                   Impression:      No acute traumatic injury  identified.    Heterogeneous hepatic enhancement is similar to prior CT.  This can be seen with hepatic venous congestion, but no periportal edema or other findings for hepatic congestion identified.  Depending on patient risk factors for chronic liver disease, follow-up abdominal MRI can be pursued to ensure this is a perfusional finding and better assess for infiltrative hepatic processes.      Electronically signed by: Siddhartha Goss  Date:    09/28/2023  Time:    16:31               Narrative:    EXAMINATION:  CT CHEST ABDOMEN PELVIS WITH CONTRAST (XPD)    CLINICAL HISTORY:  Polytrauma, blunt;    TECHNIQUE:  CT imaging of the chest, abdomen and pelvis after IV contrast. Axial, coronal and sagittal reformatted images reviewed. Dose length product is 886 mGycm. Automatic exposure control, adjustment of mA/kV or iterative reconstruction technique used to limit radiation dose.    COMPARISON:  CT 09/01/2023    FINDINGS:  Normal heart size.  No mediastinal hematoma or convincing findings for acute thoracic aortic injury.  No significant pleural or pericardial fluid.  No consolidation or pneumothorax.    Similar heterogeneous hepatic enhancement.  No defined liver or spleen laceration.  Normal pancreas, adrenal glands and kidneys.  Normal bladder.  No mesenteric hematoma, pneumoperitoneum or ascites.    No acute osseous process appreciated.                                       X-Ray Chest PA And Lateral (Final result)  Result time 09/28/23 12:39:32      Final result by Siddhartha Goss MD (09/28/23 12:39:32)                   Impression:      No acute pulmonary process appreciated.      Electronically signed by: Siddhartha Goss  Date:    09/28/2023  Time:    12:39               Narrative:    EXAMINATION:  XR CHEST PA AND LATERAL    CLINICAL HISTORY:  Hemoptysis;    TECHNIQUE:  PA and lateral radiographs of the chest    COMPARISON:  Radiography 09/01/2023    FINDINGS:  Normal cardiac silhouette. The lungs are  well-inflated. No consolidation identified. No pleural effusion or discernible pneumothorax.                                       Medications   lactated ringers bolus 1,000 mL (0 mLs Intravenous Stopped 9/28/23 1615)   iopamidoL (ISOVUE-370) injection 100 mL (100 mLs Intravenous Given 9/28/23 1538)   morphine injection 4 mg (4 mg Intravenous Given 9/28/23 1705)   ondansetron disintegrating tablet 4 mg (4 mg Oral Given 9/28/23 1705)   HYDROmorphone (PF) injection 0.5 mg (0.5 mg Intravenous Given 9/28/23 1824)     Medical Decision Making  Amount and/or Complexity of Data Reviewed  Radiology: ordered.    Risk  Prescription drug management.               ED Course as of 09/28/23 1834   Thu Sep 28, 2023   1507 Patient level 1 trauma on 09/25/23. A van fell on top of him. MRN: [id number] [JA]   1635 Trauma sx paged [JA]   8732 Discussed patient with Betty, with trauma services. Will review patient's clinical data and assess patient at bedside.  [JA]   1730 MARCO ANTONIO Ordaz, with surgery assessed the patient at bedside. Will perform hemoccult to assess for bleeding.  [JA]   1736 Hemoccult negative.  [JA]   1750 Surgery reports dispo home with f/u with PCP appropriate. No further instruction or recommendation given.  [JA]      ED Course User Index  [JA] Lucy Porter, JANET               Medical Decision Making:   History:   Old Records Summarized: records from previous admission(s).       <> Summary of Records: Patient seen on 09/25/23 after a van fell on him while working on it. Level 1 trauma. Patient's lactic elevated initially but then improved after fluids. Imaging and labs performed, which were negative. Surgery saw patient and he was discharged by the ED provider. Prescribed Norco, Zofran, Ibuprofen, and Robaxin.       Initial Assessment:   Patient awake, alert, has non-labored breathing, and follows commands appropriately. Arrived to ED due to abdominal pain, hemoptysis (bright red), and states he noticed a blood clot  in his stool. Patient was a level 1 trauma on 09/25/23. Was not able to fill Norco due to recently being prescribed Suboxone. Denies additional injury. NAD noted.   Differential Diagnosis:   Injury, Hemoptysis, Abdominal Pain   Clinical Tests:   Lab Tests: Reviewed and Ordered  Radiological Study: Ordered and Reviewed  ED Management:  Co-morbidities and/or factors adding to the complexity or risk for the patient?: none  Differential diagnoses: Injury, Hemoptysis, Abdominal Pain  Decision to obtain previous or outside records?: I reviewed records.   Chart Review (nursing home, outside records, CareEverywhere): yes  Review of RX medications/new RX prescribed by me?: Prescribed Pepcid.   Labs/imaging/other tests obtained/considered (risk/benefits of testing discussed): cbc, cmp, pt/inr, ua, uds, ua, chest xray, ct  Labs/tests intepretation: Labs unremarkable. UDS- negative. Chest Xray- No acute pulmonary process appreciated. CT- No acute traumatic injury identified. Heterogeneous hepatic enhancement is similar to prior CT.  This can be seen with hepatic venous congestion, but no periportal edema or other findings for hepatic congestion identified.  Depending on patient risk factors for chronic liver disease, follow-up abdominal MRI can be pursued to ensure this is a perfusional finding and better assess for infiltrative hepatic processes. Informed patient of results.   My independent imaging interpretation: none  Treatment/interventions, IV fluids, IV medications: IVF, Zofran, Morphine, and Dilaudid given in ER.  Complex management (IV controlled substances, went to OR, DNR, meds requiring monitoring, transfer, etc)?: none  Workup/treatment affected by social determinants of health?:none   Point of care US done/interpretation: none  Consults/radiologist/EMS/social work/family discussion/alternate history: Consulted surgery prior to dispo.   Advanced care planning/end of life discussion: none  Shared decision making:  Discussed plan of care and interventions with patient. Agreed to and aware of plan of care. Comfortable being discharged home. Instructed to f/u with PCP and GI services regarding hepatic enhancement.   ETOH/smoking/drug cessation discussion: none  Dispo: Patient discharged home. Patient denies new or additional complaints; no further tests indicated at this time. Verbalized understanding of instructions. No emergent or apparent distress noted prior to discharge. To follow up with PCP in 1 week as needed. Strict ER return precautions given.       Other:   I discussed test(s) with the performing physician.       <> Summary of the Findings: Discussed patient with Dr. Schroeder; will prescribed Pepcid.   I have discussed this case with another health care provider.      Clinical Impression:   Final diagnoses:  [R10.84] Generalized abdominal pain (Primary)  [T14.90XA] Injury  [R04.2] Hemoptysis        ED Disposition Condition    Discharge Stable          ED Prescriptions       Medication Sig Dispense Start Date End Date Auth. Provider    famotidine (PEPCID) 20 MG tablet Take 1 tablet (20 mg total) by mouth 2 (two) times daily. for 10 days 20 tablet 9/28/2023 10/8/2023 Lucy Porter, JANET          Follow-up Information       Follow up With Specialties Details Why Contact Info Additional Information    Atif Cohen MD Internal Medicine Call in 1 week If symptoms worsen, As needed [address] [facility] [address]  [phone]       [facility] - Emergency Dept Emergency Medicine Go to  If symptoms worsen, As needed [address] [facility] [address] [phone]     Lio Hendrix MD Gastroenterology Call  If symptoms worsen, As needed [address] [facility]  [address]  [facility] [id number] [phone]       [facility] Gastroenterology Call  As needed, If symptoms worsen [address] [facility] [address] [phone] Entrance 1              Lucy Porter, NP  09/28/23 1844

## 2023-09-28 NOTE — FIRST PROVIDER EVALUATION
"Medical screening examination initiated.  I have conducted a focused provider triage encounter, findings are as follows:    Brief history of present illness:  Patient states that he is coughing up blood and having "black" stools.     There were no vitals filed for this visit.    Pertinent physical exam:  Awake, alert, ambulatory    Brief workup plan:  Labs    Preliminary workup initiated; this workup will be continued and followed by the physician or advanced practice provider that is assigned to the patient when roomed.  "

## 2023-12-28 PROBLEM — F15.259 METHAMPHETAMINE-INDUCED PSYCHOTIC DISORDER WITH MODERATE OR SEVERE USE DISORDER: Status: ACTIVE | Noted: 2023-12-28

## 2023-12-28 PROBLEM — F19.10 SUBSTANCE ABUSE: Status: ACTIVE | Noted: 2023-12-28
